# Patient Record
Sex: MALE | Race: WHITE | NOT HISPANIC OR LATINO | URBAN - METROPOLITAN AREA
[De-identification: names, ages, dates, MRNs, and addresses within clinical notes are randomized per-mention and may not be internally consistent; named-entity substitution may affect disease eponyms.]

---

## 2017-04-06 ENCOUNTER — COMMUNICATION - HEALTHEAST (OUTPATIENT)
Dept: INTERNAL MEDICINE | Facility: CLINIC | Age: 37
End: 2017-04-06

## 2017-05-11 ENCOUNTER — OFFICE VISIT - HEALTHEAST (OUTPATIENT)
Dept: INTERNAL MEDICINE | Facility: CLINIC | Age: 37
End: 2017-05-11

## 2017-05-11 ENCOUNTER — COMMUNICATION - HEALTHEAST (OUTPATIENT)
Dept: INTERNAL MEDICINE | Facility: CLINIC | Age: 37
End: 2017-05-11

## 2017-05-11 DIAGNOSIS — Z72.0 TOBACCO CHEW USE: ICD-10-CM

## 2017-05-11 DIAGNOSIS — F41.1 GAD (GENERALIZED ANXIETY DISORDER): ICD-10-CM

## 2017-05-11 DIAGNOSIS — Z00.00 ROUTINE GENERAL MEDICAL EXAMINATION AT A HEALTH CARE FACILITY: ICD-10-CM

## 2017-05-11 DIAGNOSIS — K21.9 GERD (GASTROESOPHAGEAL REFLUX DISEASE): ICD-10-CM

## 2017-05-11 LAB
CHOLEST SERPL-MCNC: 171 MG/DL
FASTING STATUS PATIENT QL REPORTED: NORMAL
HDLC SERPL-MCNC: 47 MG/DL
LDLC SERPL CALC-MCNC: 103 MG/DL
TRIGL SERPL-MCNC: 105 MG/DL

## 2017-05-11 RX ORDER — PANTOPRAZOLE SODIUM 40 MG/1
40 TABLET, DELAYED RELEASE ORAL
Qty: 90 TABLET | Refills: 3 | Status: SHIPPED | OUTPATIENT
Start: 2017-05-11

## 2017-05-11 ASSESSMENT — MIFFLIN-ST. JEOR: SCORE: 1971.79

## 2017-05-31 ENCOUNTER — COMMUNICATION - HEALTHEAST (OUTPATIENT)
Dept: INTERNAL MEDICINE | Facility: CLINIC | Age: 37
End: 2017-05-31

## 2017-05-31 RX ORDER — PROPRANOLOL HYDROCHLORIDE 10 MG/1
10-20 TABLET ORAL DAILY PRN
Qty: 180 TABLET | Refills: 3 | Status: SHIPPED | OUTPATIENT
Start: 2017-05-31

## 2017-07-07 ENCOUNTER — COMMUNICATION - HEALTHEAST (OUTPATIENT)
Dept: INTERNAL MEDICINE | Facility: CLINIC | Age: 37
End: 2017-07-07

## 2017-07-07 RX ORDER — ESCITALOPRAM OXALATE 10 MG/1
TABLET ORAL
Qty: 90 TABLET | Refills: 3 | Status: SHIPPED | OUTPATIENT
Start: 2017-07-07

## 2021-05-31 VITALS — HEIGHT: 73 IN | BODY MASS INDEX: 29.16 KG/M2 | WEIGHT: 220 LBS

## 2021-06-10 NOTE — PROGRESS NOTES
Office Visit - Physical   Jamal Steward   36 y.o.  male    Date of visit: 5/11/2017  Physician: Elias Lewis MD     Assessment and Plan   1. Routine general medical examination at a health care facility  Immunizations are reviewed and will update Td recommending annual flu shot.  Living will discussed.  Continues to chew tobacco.  Non-smoker.  Uses alcohol in moderation.  He exercises on a regular basis.  He had an eye exam last year.  Skin exam performed and recommending regular use of sunblock.  Will screen for diabetes with fasting glucose.  Checking fasting lipid profile.      - Lipid Cascade  - Comprehensive Metabolic Panel  - Hemoglobin    2. GERD (gastroesophageal reflux disease)  Currently well controlled with pantoprazole 40 mg daily.  Component of IBS.  Stress seems to worsen.  He underwent EGD showing some distal esophagitis but biopsies negative.    3. CANDE (generalized anxiety disorder)  Overall well controlled with Lexapro.  He does have some anxiety talking in front of large groups and we can try propranolol 10-20 mg 1 hour before needed.  We discussed potential side effects.    4. Tobacco chew use  Urging him to quit chewing tobacco.  He was successful quitting for 1 year several years ago.  We discussed options that may help including gum or nicotine gum or chew substitute    Return in about 1 year (around 5/11/2018) for Annual physical.     Chief Complaint   Chief Complaint   Patient presents with     Annual Exam        Patient Profile   Social History     Social History Narrative    Investment            Past Medical History   Patient Active Problem List   Diagnosis     GERD (gastroesophageal reflux disease)     CANDE (generalized anxiety disorder)     IBS (irritable bowel syndrome)     Tobacco chew use       Past Surgical History  He has no past surgical history on file.     History of Present Illness   This 36 y.o. old gentleman is here for his annual physical.  Was having  increasing reflux symptoms last fall.  May have been related to stress.  Doing better using pantoprazole 40 mg daily.  Generalized anxiety well controlled with Lexapro but still has some stage fright symptoms.  He gets dry mouth with palpitations and diaphoresis when having to go in front of large crowds to speak.  He is asking if there is some medication he can take.    Review of Systems: A comprehensive review of systems was negative except as noted.  General: No chronic fatigue, unexpected weight loss or weight gain, fevers, chills, or night sweats  Eyes: No significant change in vision.  Seeing ophthalmologist regularly.  ENT: No ear or sinus infections.  No change in hearing.  No tinnitus.  Respiratory: No wheezing, dyspnea on exertion, or chronic cough  Cardiovascular: No chest pain, palpitations, dizziness, or syncope.  No peripheral edema.  GI: No abdominal pain, reflux symptoms, dysphagia, nausea, vomiting, constipation, or diarrhea  : No change in frequency or incontinence.  No hematuria.  Skin: No new rashes or lesions.  Neurologic: No headaches, seizures, dizziness, weakness, or numbness.  Musculoskeletal: No muscle or joint pain.  Lymphatic: No swollen lymph nodes  Psychiatric: Anxiety mostly under good control      Medications and Allergies   Current Outpatient Prescriptions   Medication Sig Dispense Refill     escitalopram oxalate (LEXAPRO) 10 MG tablet TAKE 1 TABLET (10 MG TOTAL) BY MOUTH DAILY. 90 tablet 3     pantoprazole (PROTONIX) 40 MG tablet Take 1 tablet (40 mg total) by mouth Daily before breakfast. 90 tablet 3     propranolol (INDERAL) 10 MG tablet Take 1-2 tablets (10-20 mg total) by mouth daily as needed. 30 tablet 5     No current facility-administered medications for this visit.      Allergies   Allergen Reactions     Penicillins         Family and Social History   Family History   Problem Relation Age of Onset     Osteoporosis Mother      Hypertension Father      Alcoholism Father   "    Anxiety disorder Father      Anxiety disorder Sister         Social History   Substance Use Topics     Smoking status: Never Smoker     Smokeless tobacco: Current User     Types: Chew     Alcohol use Yes      Comment: occasional        Physical Exam   General Appearance:   Well-appearing male    /80 (Patient Site: Right Arm, Patient Position: Sitting, Cuff Size: Adult Regular)  Pulse (!) 58  Ht 6' 1\" (1.854 m)  Wt 220 lb (99.8 kg)  SpO2 99%  BMI 29.03 kg/m2    EYES: Eyelids, conjunctiva, and sclera were normal. Pupils were normal. Cornea, iris, and lens were normal bilaterally.  HEAD, EARS, NOSE, MOUTH, AND THROAT: Head and face were normal. Nose appearance was normal and there was no discharge. Oropharynx was normal.  NECK: Neck appearance was normal. There were no neck masses and the thyroid was not enlarged and no nodules are felt.  No lymphadenopathy.  RESPIRATORY: Breathing pattern was normal and the chest moved symmetrically.  Percussion/auscultatory percussion was normal.  Lung sounds were normal and there were no rales or wheezes.  CARDIOVASCULAR: Heart rate and rhythm were normal.  S1 and S2 were normal and there were no extra sounds or murmurs. Peripheral pulses in arms and legs were normal.  Jugular venous pressure was normal.  There was no peripheral edema.    GASTROINTESTINAL: The abdomen was normal in contour.  Bowel sounds were present.  Percussion detected no organ enlargement or tenderness.  Palpation detected no tenderness, mass, or enlarged organs.   GENITALIA: No penile or testicular lesions.  No inguinal hernia.  MUSCULOSKELETAL: Skeletal configuration was normal and muscle mass was normal for age. Joint appearance was overall normal.  LYMPHATIC: There were no enlarged nodes.  SKIN/HAIR/NAILS: Skin color was normal.  There were no skin lesions.  Hair and nails were normal.  NEUROLOGIC: The patient was alert and oriented to person, place, time, and circumstance. Speech was normal. " Cranial nerves were normal. Motor strength was normal for age. The patient was normally coordinated.  Sensation intact.  PSYCHIATRIC:  Mood and affect were normal and the patient had normal recent and remote memory. The patient's judgment and insight were normal.       Additional Information        Elias Lewis MD  Internal Medicine  Contact me at [phone]

## 2021-06-15 PROBLEM — Z72.0 TOBACCO CHEW USE: Status: ACTIVE | Noted: 2017-05-11
